# Patient Record
Sex: MALE | Race: WHITE | NOT HISPANIC OR LATINO | URBAN - METROPOLITAN AREA
[De-identification: names, ages, dates, MRNs, and addresses within clinical notes are randomized per-mention and may not be internally consistent; named-entity substitution may affect disease eponyms.]

---

## 2019-09-23 ENCOUNTER — EMERGENCY (EMERGENCY)
Facility: HOSPITAL | Age: 54
LOS: 1 days | Discharge: HOME | End: 2019-09-23
Attending: EMERGENCY MEDICINE | Admitting: EMERGENCY MEDICINE
Payer: COMMERCIAL

## 2019-09-23 VITALS
RESPIRATION RATE: 18 BRPM | DIASTOLIC BLOOD PRESSURE: 81 MMHG | OXYGEN SATURATION: 95 % | HEIGHT: 69 IN | HEART RATE: 91 BPM | WEIGHT: 229.94 LBS | TEMPERATURE: 99 F | SYSTOLIC BLOOD PRESSURE: 139 MMHG

## 2019-09-23 PROCEDURE — 99283 EMERGENCY DEPT VISIT LOW MDM: CPT

## 2019-09-23 RX ORDER — IBUPROFEN 200 MG
600 TABLET ORAL ONCE
Refills: 0 | Status: COMPLETED | OUTPATIENT
Start: 2019-09-23 | End: 2019-09-23

## 2019-09-23 RX ADMIN — Medication 600 MILLIGRAM(S): at 11:22

## 2019-09-23 NOTE — ED PROVIDER NOTE - NSFOLLOWUPINSTRUCTIONS_ED_ALL_ED_FT
1) Please follow-up with an orthopedist (referral information attached).  If you cannot follow-up with your doctor(s), please return to the ED for any urgent issues.  2) If you have any worsening of symptoms or any other concerns please return to the ED immediately.  3) Please continue taking your home medications as directed.  4) You may have been given a copy of your labs and/or imaging.  Please go over these with your primary care doctor.

## 2019-09-23 NOTE — ED ADULT NURSE NOTE - NSIMPLEMENTINTERV_GEN_ALL_ED
Implemented All Fall Risk Interventions:  [place] to call system. Call bell, personal items and telephone within reach. Instruct patient to call for assistance. Room bathroom lighting operational. Non-slip footwear when patient is off stretcher. Physically safe environment: no spills, clutter or unnecessary equipment. Stretcher in lowest position, wheels locked, appropriate side rails in place. Provide visual cue, wrist band, yellow gown, etc. Monitor gait and stability. Monitor for mental status changes and reorient to person, place, and time. Review medications for side effects contributing to fall risk. Reinforce activity limits and safety measures with patient and family.

## 2019-09-23 NOTE — ED PROVIDER NOTE - OBJECTIVE STATEMENT
54M hx dm presents ~1hr s/p slip and fall in the lobby of his building. Per pt there was cream cheese on the floor that he didn't see which caused his L foot to slide forward, forcing him into a front split position. pt says he doesn't think he did a full split but immediately following the incident he had acute pain in the posterior thigh. Pt thinks he may have "pulled a hammy". Denies knee or ankle pain. Denies head trauma, LOC. Could not extend his leg 2/2 pain following the incident but now says he can. 54M hx dm presents ~1hr s/p slip and fall in the lobby of his building. Per pt there was cream cheese on the floor that he didn't see which caused his L foot to slide forward, forcing him into a front split position. pt says he doesn't think he did a full split but immediately following the incident he had acute pain in the L posterior thigh. Pt thinks he may have "pulled a hammy". Denies knee or ankle pain. Denies head trauma, LOC. Could not extend his leg 2/2 pain following the incident but now says he can.

## 2019-09-23 NOTE — ED PROVIDER NOTE - CLINICAL SUMMARY MEDICAL DECISION MAKING FREE TEXT BOX
54M presents s/p mechanical fall. +L posterior thigh pain. No visible signs of trauma or swelling in the knee or ankle. pt has full rom in the affected leg. will dose motrin, give hot pack, and reassess in an hour.

## 2019-09-23 NOTE — ED ADULT NURSE NOTE - CHPI ED NUR SYMPTOMS NEG
no deformity/no numbness/no vomiting/no weakness/no loss of consciousness/no fever/no abrasion/no bleeding/no confusion

## 2019-09-23 NOTE — ED ADULT TRIAGE NOTE - CHIEF COMPLAINT QUOTE
BIBA c/o left hamstring pain after slipping on cream cheese causing him to make a split on the floor. denies head injury.

## 2019-09-23 NOTE — ED PROVIDER NOTE - PATIENT PORTAL LINK FT
You can access the FollowMyHealth Patient Portal offered by [facility] by registering at the following website: [email or web address]. By joining [facility]’s FollowMyHealth portal, you will also be able to view your health information using other applications (apps) compatible with our system.

## 2019-09-23 NOTE — ED PROVIDER NOTE - ATTENDING CONTRIBUTION TO CARE
Pt is a 53yo m with a h/o DM who p/w slip and strain of L hamstring.  ROS - Denies head trauma, LOC, CP, SOB, or other concerns.   A/P - Muscle strain/tear of L hamstring.  rest, NSAIDs, gave crutches to aid in ambulation, ortho f/u.
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
## 2019-09-28 DIAGNOSIS — M79.652 PAIN IN LEFT THIGH: ICD-10-CM
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
## 2019-09-28 DIAGNOSIS — S76.912A STRAIN OF UNSPECIFIED MUSCLES, FASCIA AND TENDONS AT THIGH LEVEL, LEFT THIGH, INITIAL ENCOUNTER: ICD-10-CM

## 2023-09-26 NOTE — ED ADULT NURSE NOTE - NSFALLRSKASSESASSIST_ED_ALL_ED
CBC/ANC result from 9/22223 2.7. Pt enrolled into [facility]. Entered into [facility]. Faxed to Pt's Pharmacy [phone].     Will close encounter once confirmation of successful fax sent/received.  
Confirmation of successful fax sent/received Closing encounter.  
no